# Patient Record
Sex: FEMALE | Race: OTHER
[De-identification: names, ages, dates, MRNs, and addresses within clinical notes are randomized per-mention and may not be internally consistent; named-entity substitution may affect disease eponyms.]

---

## 2019-09-07 ENCOUNTER — HOSPITAL ENCOUNTER (INPATIENT)
Dept: HOSPITAL 54 - ER | Age: 84
LOS: 6 days | Discharge: NURSING, REHAB OR LONG-TERM CARE | DRG: 871 | End: 2019-09-13
Attending: LEGAL MEDICINE | Admitting: NURSE PRACTITIONER
Payer: MEDICARE

## 2019-09-07 VITALS — SYSTOLIC BLOOD PRESSURE: 147 MMHG | DIASTOLIC BLOOD PRESSURE: 65 MMHG

## 2019-09-07 VITALS — BODY MASS INDEX: 20.49 KG/M2 | HEIGHT: 64 IN | WEIGHT: 120 LBS

## 2019-09-07 DIAGNOSIS — G92: ICD-10-CM

## 2019-09-07 DIAGNOSIS — J84.9: ICD-10-CM

## 2019-09-07 DIAGNOSIS — Z16.12: ICD-10-CM

## 2019-09-07 DIAGNOSIS — I25.2: ICD-10-CM

## 2019-09-07 DIAGNOSIS — E83.42: ICD-10-CM

## 2019-09-07 DIAGNOSIS — G20: ICD-10-CM

## 2019-09-07 DIAGNOSIS — E86.0: ICD-10-CM

## 2019-09-07 DIAGNOSIS — I25.10: ICD-10-CM

## 2019-09-07 DIAGNOSIS — I10: ICD-10-CM

## 2019-09-07 DIAGNOSIS — E78.5: ICD-10-CM

## 2019-09-07 DIAGNOSIS — J45.909: ICD-10-CM

## 2019-09-07 DIAGNOSIS — E11.9: ICD-10-CM

## 2019-09-07 DIAGNOSIS — N39.0: ICD-10-CM

## 2019-09-07 DIAGNOSIS — B96.20: ICD-10-CM

## 2019-09-07 DIAGNOSIS — E87.2: ICD-10-CM

## 2019-09-07 DIAGNOSIS — J44.9: ICD-10-CM

## 2019-09-07 DIAGNOSIS — Z79.4: ICD-10-CM

## 2019-09-07 DIAGNOSIS — G30.9: ICD-10-CM

## 2019-09-07 DIAGNOSIS — F32.9: ICD-10-CM

## 2019-09-07 DIAGNOSIS — A41.9: Primary | ICD-10-CM

## 2019-09-07 DIAGNOSIS — F02.80: ICD-10-CM

## 2019-09-07 LAB
ALBUMIN SERPL BCP-MCNC: 3.1 G/DL (ref 3.4–5)
ALP SERPL-CCNC: 112 U/L (ref 46–116)
ALT SERPL W P-5'-P-CCNC: 18 U/L (ref 12–78)
AST SERPL W P-5'-P-CCNC: 15 U/L (ref 15–37)
BASOPHILS # BLD AUTO: 0 /CMM (ref 0–0.2)
BASOPHILS NFR BLD AUTO: 0.6 % (ref 0–2)
BILIRUB DIRECT SERPL-MCNC: 0.1 MG/DL (ref 0–0.2)
BILIRUB SERPL-MCNC: 0.2 MG/DL (ref 0.2–1)
BUN SERPL-MCNC: 19 MG/DL (ref 7–18)
CALCIUM SERPL-MCNC: 9.1 MG/DL (ref 8.5–10.1)
CHLORIDE SERPL-SCNC: 106 MMOL/L (ref 98–107)
CO2 SERPL-SCNC: 27 MMOL/L (ref 21–32)
CREAT SERPL-MCNC: 1.1 MG/DL (ref 0.6–1.3)
EOSINOPHIL NFR BLD AUTO: 1.4 % (ref 0–6)
GLUCOSE SERPL-MCNC: 126 MG/DL (ref 74–106)
HCT VFR BLD AUTO: 37 % (ref 33–45)
HGB BLD-MCNC: 12 G/DL (ref 11.5–14.8)
LYMPHOCYTES NFR BLD AUTO: 1.8 /CMM (ref 0.8–4.8)
LYMPHOCYTES NFR BLD AUTO: 21.8 % (ref 20–44)
MCHC RBC AUTO-ENTMCNC: 33 G/DL (ref 31–36)
MCV RBC AUTO: 93 FL (ref 82–100)
MONOCYTES NFR BLD AUTO: 0.4 /CMM (ref 0.1–1.3)
MONOCYTES NFR BLD AUTO: 5.1 % (ref 2–12)
NEUTROPHILS # BLD AUTO: 5.9 /CMM (ref 1.8–8.9)
NEUTROPHILS NFR BLD AUTO: 71.1 % (ref 43–81)
PH UR STRIP: 5.5 [PH] (ref 5–8)
PLATELET # BLD AUTO: 166 /CMM (ref 150–450)
POTASSIUM SERPL-SCNC: 4.2 MMOL/L (ref 3.5–5.1)
PROT SERPL-MCNC: 6.4 G/DL (ref 6.4–8.2)
RBC # BLD AUTO: 3.95 MIL/UL (ref 4–5.2)
RBC #/AREA URNS HPF: (no result) /HPF (ref 0–2)
SODIUM SERPL-SCNC: 144 MMOL/L (ref 136–145)
UROBILINOGEN UR STRIP-MCNC: 0.2 EU/DL
WBC #/AREA URNS HPF: (no result) /HPF (ref 0–3)
WBC NRBC COR # BLD AUTO: 8.3 K/UL (ref 4.3–11)

## 2019-09-07 PROCEDURE — A6248 HYDROGEL DRSG GEL FILLER: HCPCS

## 2019-09-07 PROCEDURE — C9113 INJ PANTOPRAZOLE SODIUM, VIA: HCPCS

## 2019-09-07 PROCEDURE — G0378 HOSPITAL OBSERVATION PER HR: HCPCS

## 2019-09-07 RX ADMIN — Medication SCH EACH: at 22:16

## 2019-09-07 RX ADMIN — QUETIAPINE SCH MG: 25 TABLET, FILM COATED ORAL at 22:41

## 2019-09-07 RX ADMIN — MONTELUKAST SODIUM SCH MG: 10 TABLET, FILM COATED ORAL at 22:41

## 2019-09-07 RX ADMIN — MEMANTINE HYDROCHLORIDE SCH MG: 5 TABLET ORAL at 22:41

## 2019-09-07 NOTE — NUR
PT [id number], FROM HOME, ALTERED THAN USUAL, , PT IS AAOX1, NOT IN 
RESPIRATORY DISTRESS, HOOKED TO MONITOR, KEPT RESTED AND COMFORTABLE, WILL 
CONTINUE TO MONITOR.

## 2019-09-07 NOTE — NUR
TELE RN ADMITTING NOTES



RECEIVED PATIENT FROM ED VIA [employer]. PRIMARY DX OF SEPSIS PER HOSPITALIST. PATIENT AWAKE, 
NONVERBAL. UNKNOWN PRIMARY LANGUAGE. IV SITE RIGHT HAND 18G, FLUSHING AND PATENT, SITE 
C/D/I. NO IV INFILTRATION NOTED. NO ACTIVE BLEEDING. NO ACUTE DISTRESS NOTED, NO C/O PAIN. 
AFEBRILE. SR ON CARDIAC MONITOR SR WITH HR 80S. BODY ASSESSMENT DONE AND RECORDED (SEE 
FLOWSHEET). SAFETY MEASURES IN PLACE; HOB AT 30 DEGREES, BED LOCKED AND IN LOWEST POSITION, 
BED ALARM ON AND SIDE RAILS UP X2. HOSPITALIST AWARE OF OF PATIENT ARRIVAL ON UNIT, AWAITING 
FOR ORDERS. WILL CONTINUE TO MONITOR PT CLOSELY.

## 2019-09-07 NOTE — NUR
RECEIVED PT FROM DAY SHIFT RN. PT IS CONFUSED. AWAKE, NON VERBAL. RESPONSIVE TO 
TACTILE STIMULI. BREATHING EVEN AND UNLABORED. IN NO ACUTE DISTRESS. GRANDSON 
AT BEDSIDE. IV TO RIGHT HAND RUNNING 1L NS AND ZOSYN.

## 2019-09-08 VITALS — DIASTOLIC BLOOD PRESSURE: 67 MMHG | SYSTOLIC BLOOD PRESSURE: 149 MMHG

## 2019-09-08 VITALS — DIASTOLIC BLOOD PRESSURE: 68 MMHG | SYSTOLIC BLOOD PRESSURE: 153 MMHG

## 2019-09-08 VITALS — DIASTOLIC BLOOD PRESSURE: 44 MMHG | SYSTOLIC BLOOD PRESSURE: 127 MMHG

## 2019-09-08 VITALS — DIASTOLIC BLOOD PRESSURE: 71 MMHG | SYSTOLIC BLOOD PRESSURE: 132 MMHG

## 2019-09-08 VITALS — DIASTOLIC BLOOD PRESSURE: 73 MMHG | SYSTOLIC BLOOD PRESSURE: 156 MMHG

## 2019-09-08 VITALS — SYSTOLIC BLOOD PRESSURE: 145 MMHG | DIASTOLIC BLOOD PRESSURE: 57 MMHG

## 2019-09-08 LAB
ALBUMIN SERPL BCP-MCNC: 2.6 G/DL (ref 3.4–5)
ALP SERPL-CCNC: 85 U/L (ref 46–116)
ALT SERPL W P-5'-P-CCNC: 11 U/L (ref 12–78)
AST SERPL W P-5'-P-CCNC: 11 U/L (ref 15–37)
BASOPHILS # BLD AUTO: 0 /CMM (ref 0–0.2)
BASOPHILS NFR BLD AUTO: 0.5 % (ref 0–2)
BILIRUB SERPL-MCNC: 0.3 MG/DL (ref 0.2–1)
BUN SERPL-MCNC: 17 MG/DL (ref 7–18)
CALCIUM SERPL-MCNC: 8 MG/DL (ref 8.5–10.1)
CHLORIDE SERPL-SCNC: 111 MMOL/L (ref 98–107)
CHOLEST SERPL-MCNC: 161 MG/DL (ref ?–200)
CO2 SERPL-SCNC: 25 MMOL/L (ref 21–32)
CREAT SERPL-MCNC: 0.9 MG/DL (ref 0.6–1.3)
EOSINOPHIL NFR BLD AUTO: 3.9 % (ref 0–6)
GLUCOSE SERPL-MCNC: 61 MG/DL (ref 74–106)
HCT VFR BLD AUTO: 32 % (ref 33–45)
HDLC SERPL-MCNC: 25 MG/DL (ref 40–60)
HGB BLD-MCNC: 10.7 G/DL (ref 11.5–14.8)
LDLC SERPL DIRECT ASSAY-MCNC: 93 MG/DL (ref 0–99)
LYMPHOCYTES NFR BLD AUTO: 2.5 /CMM (ref 0.8–4.8)
LYMPHOCYTES NFR BLD AUTO: 31.9 % (ref 20–44)
MAGNESIUM SERPL-MCNC: 1.6 MG/DL (ref 1.8–2.4)
MCHC RBC AUTO-ENTMCNC: 33 G/DL (ref 31–36)
MCV RBC AUTO: 93 FL (ref 82–100)
MONOCYTES NFR BLD AUTO: 0.5 /CMM (ref 0.1–1.3)
MONOCYTES NFR BLD AUTO: 6.8 % (ref 2–12)
NEUTROPHILS # BLD AUTO: 4.5 /CMM (ref 1.8–8.9)
NEUTROPHILS NFR BLD AUTO: 56.9 % (ref 43–81)
PHOSPHATE SERPL-MCNC: 3 MG/DL (ref 2.5–4.9)
PLATELET # BLD AUTO: 136 /CMM (ref 150–450)
POTASSIUM SERPL-SCNC: 4 MMOL/L (ref 3.5–5.1)
PROT SERPL-MCNC: 5.4 G/DL (ref 6.4–8.2)
RBC # BLD AUTO: 3.49 MIL/UL (ref 4–5.2)
SODIUM SERPL-SCNC: 145 MMOL/L (ref 136–145)
TRIGL SERPL-MCNC: 277 MG/DL (ref 30–150)
TSH SERPL DL<=0.005 MIU/L-ACNC: 0.59 UIU/ML (ref 0.36–3.74)
WBC NRBC COR # BLD AUTO: 7.8 K/UL (ref 4.3–11)

## 2019-09-08 PROCEDURE — 05H633Z INSERTION OF INFUSION DEVICE INTO LEFT SUBCLAVIAN VEIN, PERCUTANEOUS APPROACH: ICD-10-PCS | Performed by: NURSE PRACTITIONER

## 2019-09-08 RX ADMIN — Medication SCH EACH: at 10:43

## 2019-09-08 RX ADMIN — ESCITALOPRAM OXALATE SCH MG: 10 TABLET, FILM COATED ORAL at 10:43

## 2019-09-08 RX ADMIN — DEXTROSE MONOHYDRATE SCH MLS/HR: 50 INJECTION, SOLUTION INTRAVENOUS at 21:18

## 2019-09-08 RX ADMIN — QUETIAPINE SCH MG: 25 TABLET, FILM COATED ORAL at 21:51

## 2019-09-08 RX ADMIN — PANTOPRAZOLE SODIUM SCH MG: 40 TABLET, DELAYED RELEASE ORAL at 10:50

## 2019-09-08 RX ADMIN — MEMANTINE HYDROCHLORIDE SCH MG: 5 TABLET ORAL at 21:51

## 2019-09-08 RX ADMIN — DEXTROSE AND SODIUM CHLORIDE PRN MLS/HR: 5; 900 INJECTION, SOLUTION INTRAVENOUS at 11:37

## 2019-09-08 RX ADMIN — MAGNESIUM SULFATE IN DEXTROSE SCH MLS/HR: 10 INJECTION, SOLUTION INTRAVENOUS at 10:51

## 2019-09-08 RX ADMIN — ALBUTEROL SULFATE PRN MG: 1.25 SOLUTION RESPIRATORY (INHALATION) at 11:31

## 2019-09-08 RX ADMIN — PIPERACILLIN SODIUM AND TAZOBACTAM SODIUM SCH MLS/HR: .375; 3 INJECTION, POWDER, LYOPHILIZED, FOR SOLUTION INTRAVENOUS at 10:16

## 2019-09-08 RX ADMIN — PIPERACILLIN SODIUM AND TAZOBACTAM SODIUM SCH MLS/HR: .375; 3 INJECTION, POWDER, LYOPHILIZED, FOR SOLUTION INTRAVENOUS at 00:34

## 2019-09-08 RX ADMIN — MEGESTROL ACETATE SCH MG: 40 TABLET ORAL at 10:44

## 2019-09-08 RX ADMIN — INSULIN ASPART SCH UNIT: 100 INJECTION, SOLUTION INTRAVENOUS; SUBCUTANEOUS at 17:00

## 2019-09-08 RX ADMIN — Medication SCH EACH: at 07:30

## 2019-09-08 RX ADMIN — INSULIN HUMAN PRN UNIT: 100 INJECTION, SOLUTION PARENTERAL at 21:43

## 2019-09-08 RX ADMIN — INSULIN ASPART SCH UNIT: 100 INJECTION, SOLUTION INTRAVENOUS; SUBCUTANEOUS at 09:00

## 2019-09-08 RX ADMIN — Medication SCH EACH: at 21:43

## 2019-09-08 RX ADMIN — Medication SCH EACH: at 11:37

## 2019-09-08 RX ADMIN — PIPERACILLIN SODIUM AND TAZOBACTAM SODIUM SCH MLS/HR: .375; 3 INJECTION, POWDER, LYOPHILIZED, FOR SOLUTION INTRAVENOUS at 05:39

## 2019-09-08 RX ADMIN — Medication SCH EACH: at 17:15

## 2019-09-08 RX ADMIN — MONTELUKAST SODIUM SCH MG: 10 TABLET, FILM COATED ORAL at 21:51

## 2019-09-08 RX ADMIN — MAGNESIUM SULFATE IN DEXTROSE SCH MLS/HR: 10 INJECTION, SOLUTION INTRAVENOUS at 12:37

## 2019-09-08 RX ADMIN — Medication SCH MG: at 10:44

## 2019-09-08 RX ADMIN — PIPERACILLIN SODIUM AND TAZOBACTAM SODIUM SCH MLS/HR: .375; 3 INJECTION, POWDER, LYOPHILIZED, FOR SOLUTION INTRAVENOUS at 17:15

## 2019-09-08 RX ADMIN — ATORVASTATIN CALCIUM SCH MG: 10 TABLET, FILM COATED ORAL at 10:43

## 2019-09-08 NOTE — NUR
TELE RN CLOSING NOTES



PATIENT SLEEPING IN BED, BUT EASY TO AROUSE, NONVERBAL. IV SITE RIGHT HAND 18G AND RIGHT 
WRIST 22G, BOTH FLUSHING AND PATENT, SITES C/D/I. IV FLUIDS RUNNING AS ORDERED, NO IV 
INFILTRATION NOTED. NO ACUTE DISTRESS NOTED, NO C/O PAIN. AFEBRILE. SR ON CARDIAC MONITOR SR 
WITH HR 80S. SAFETY MEASURES MAINTAINED; HOB AT 30 DEGREES, BED LOCKED AND IN LOWEST 
POSITION, BED ALARM ON AND SIDE RAILS UP X2. ALL ADMITTING ORDERS ATTENDED. KEPT PATIENT 
COMFORTABLE. REPOSITIONED Q2H. WILL ENDORSE TO AM RN FOR STEPHANIA.

## 2019-09-08 NOTE — NUR
RN NOTES,



PATIENT'S PO MEDICATIONS FOR 2200 NON ADMINISTERED DUE TO PATIENT NPO AT THIS TIME, SWALLOW 
EVALUATION IN THE MORNING, CONCERN FOR ASPIRATION ACCORDING TO PRIOR NURSE REPORT, WILL 
CONTINUE TO MONITOR CLOSELY..

## 2019-09-09 VITALS — SYSTOLIC BLOOD PRESSURE: 151 MMHG | DIASTOLIC BLOOD PRESSURE: 56 MMHG

## 2019-09-09 VITALS — SYSTOLIC BLOOD PRESSURE: 154 MMHG | DIASTOLIC BLOOD PRESSURE: 74 MMHG

## 2019-09-09 VITALS — SYSTOLIC BLOOD PRESSURE: 135 MMHG | DIASTOLIC BLOOD PRESSURE: 58 MMHG

## 2019-09-09 VITALS — SYSTOLIC BLOOD PRESSURE: 149 MMHG | DIASTOLIC BLOOD PRESSURE: 72 MMHG

## 2019-09-09 VITALS — DIASTOLIC BLOOD PRESSURE: 65 MMHG | SYSTOLIC BLOOD PRESSURE: 150 MMHG

## 2019-09-09 VITALS — DIASTOLIC BLOOD PRESSURE: 63 MMHG | SYSTOLIC BLOOD PRESSURE: 124 MMHG

## 2019-09-09 LAB
BASOPHILS # BLD AUTO: 0 /CMM (ref 0–0.2)
BASOPHILS NFR BLD AUTO: 0.6 % (ref 0–2)
BUN SERPL-MCNC: 11 MG/DL (ref 7–18)
CALCIUM SERPL-MCNC: 8.2 MG/DL (ref 8.5–10.1)
CHLORIDE SERPL-SCNC: 110 MMOL/L (ref 98–107)
CO2 SERPL-SCNC: 25 MMOL/L (ref 21–32)
CREAT SERPL-MCNC: 0.8 MG/DL (ref 0.6–1.3)
EOSINOPHIL NFR BLD AUTO: 4 % (ref 0–6)
EOSINOPHIL NFR BLD MANUAL: 4 % (ref 0–4)
GLUCOSE SERPL-MCNC: 78 MG/DL (ref 74–106)
HCT VFR BLD AUTO: 32 % (ref 33–45)
HGB BLD-MCNC: 10.8 G/DL (ref 11.5–14.8)
LYMPHOCYTES NFR BLD AUTO: 2.7 /CMM (ref 0.8–4.8)
LYMPHOCYTES NFR BLD AUTO: 34.3 % (ref 20–44)
LYMPHOCYTES NFR BLD MANUAL: 37 % (ref 16–48)
MAGNESIUM SERPL-MCNC: 2 MG/DL (ref 1.8–2.4)
MCHC RBC AUTO-ENTMCNC: 33 G/DL (ref 31–36)
MCV RBC AUTO: 92 FL (ref 82–100)
METAMYELOCYTES NFR BLD MANUAL: 1 % (ref 0–0)
MONOCYTES NFR BLD AUTO: 0.5 /CMM (ref 0.1–1.3)
MONOCYTES NFR BLD AUTO: 6.6 % (ref 2–12)
MONOCYTES NFR BLD MANUAL: 3 % (ref 0–11)
NEUTROPHILS # BLD AUTO: 4.3 /CMM (ref 1.8–8.9)
NEUTROPHILS NFR BLD AUTO: 54.5 % (ref 43–81)
NEUTS BAND NFR BLD MANUAL: 1 % (ref 0–5)
NEUTS SEG NFR BLD MANUAL: 54 % (ref 42–76)
PHOSPHATE SERPL-MCNC: 2.7 MG/DL (ref 2.5–4.9)
PLATELET # BLD AUTO: 160 /CMM (ref 150–450)
POTASSIUM SERPL-SCNC: 3.7 MMOL/L (ref 3.5–5.1)
RBC # BLD AUTO: 3.53 MIL/UL (ref 4–5.2)
SODIUM SERPL-SCNC: 144 MMOL/L (ref 136–145)
WBC NRBC COR # BLD AUTO: 7.8 K/UL (ref 4.3–11)

## 2019-09-09 RX ADMIN — Medication SCH EACH: at 08:07

## 2019-09-09 RX ADMIN — PIPERACILLIN SODIUM AND TAZOBACTAM SODIUM SCH MLS/HR: .375; 3 INJECTION, POWDER, LYOPHILIZED, FOR SOLUTION INTRAVENOUS at 00:31

## 2019-09-09 RX ADMIN — GLIMEPIRIDE SCH MG: 1 TABLET ORAL at 10:42

## 2019-09-09 RX ADMIN — ESCITALOPRAM OXALATE SCH MG: 10 TABLET, FILM COATED ORAL at 08:20

## 2019-09-09 RX ADMIN — Medication SCH EACH: at 12:00

## 2019-09-09 RX ADMIN — INSULIN ASPART SCH UNIT: 100 INJECTION, SOLUTION INTRAVENOUS; SUBCUTANEOUS at 09:00

## 2019-09-09 RX ADMIN — PIPERACILLIN SODIUM AND TAZOBACTAM SODIUM SCH MLS/HR: .375; 3 INJECTION, POWDER, LYOPHILIZED, FOR SOLUTION INTRAVENOUS at 08:21

## 2019-09-09 RX ADMIN — QUETIAPINE SCH MG: 25 TABLET, FILM COATED ORAL at 21:00

## 2019-09-09 RX ADMIN — Medication SCH EACH: at 09:10

## 2019-09-09 RX ADMIN — ALBUTEROL SCH MG: 2 TABLET ORAL at 16:45

## 2019-09-09 RX ADMIN — Medication SCH MG: at 08:20

## 2019-09-09 RX ADMIN — METOPROLOL SUCCINATE SCH MG: 50 TABLET, EXTENDED RELEASE ORAL at 10:42

## 2019-09-09 RX ADMIN — PANTOPRAZOLE SODIUM SCH MG: 40 TABLET, DELAYED RELEASE ORAL at 07:30

## 2019-09-09 RX ADMIN — PIPERACILLIN SODIUM AND TAZOBACTAM SODIUM SCH MLS/HR: .375; 3 INJECTION, POWDER, LYOPHILIZED, FOR SOLUTION INTRAVENOUS at 16:45

## 2019-09-09 RX ADMIN — DEXTROSE AND SODIUM CHLORIDE PRN MLS/HR: 5; 900 INJECTION, SOLUTION INTRAVENOUS at 08:30

## 2019-09-09 RX ADMIN — ATORVASTATIN CALCIUM SCH MG: 10 TABLET, FILM COATED ORAL at 08:20

## 2019-09-09 RX ADMIN — ALBUTEROL SCH MG: 2 TABLET ORAL at 10:59

## 2019-09-09 RX ADMIN — DEXTROSE MONOHYDRATE SCH MLS/HR: 50 INJECTION, SOLUTION INTRAVENOUS at 20:59

## 2019-09-09 RX ADMIN — Medication SCH EACH: at 16:42

## 2019-09-09 RX ADMIN — MEGESTROL ACETATE SCH MG: 40 TABLET ORAL at 08:20

## 2019-09-09 RX ADMIN — Medication SCH EACH: at 08:19

## 2019-09-09 RX ADMIN — ALBUTEROL SULFATE PRN MG: 1.25 SOLUTION RESPIRATORY (INHALATION) at 06:07

## 2019-09-09 RX ADMIN — Medication SCH EACH: at 16:45

## 2019-09-09 RX ADMIN — MONTELUKAST SODIUM SCH MG: 10 TABLET, FILM COATED ORAL at 21:06

## 2019-09-09 RX ADMIN — Medication SCH MG: at 08:18

## 2019-09-09 RX ADMIN — AMLODIPINE BESYLATE SCH MG: 5 TABLET ORAL at 21:06

## 2019-09-09 RX ADMIN — Medication SCH GM: at 11:00

## 2019-09-09 RX ADMIN — MEMANTINE HYDROCHLORIDE SCH MG: 5 TABLET ORAL at 21:00

## 2019-09-09 RX ADMIN — INSULIN ASPART SCH UNIT: 100 INJECTION, SOLUTION INTRAVENOUS; SUBCUTANEOUS at 16:41

## 2019-09-09 RX ADMIN — Medication SCH EACH: at 22:00

## 2019-09-09 NOTE — NUR
RN NOTES,



RECEIVED PATIENT IN BED, AWAKE NONVERBAL,  ON ROOM AIR, BREATHING EVEN AND UNLABORED, NO 
SOB/ACUTE RESPIRATION DISTRESS, NSR ON TELE MONITOR WITH HR IN THE 60S AT THIS TIME,  NO 
SIGNS OF PAIN/DISCOMFORT, JUAN MIDLINE  IN PLACED, SITE CLEAR, PATENT AND INTACT, IV FLUIDS 
INFUSING WELL AND PATIENT TOLERATED WELL,  KEPT PATIENT DRY AND CLEAN, WELL REPOSITIONED AT 
THIS TIME, SAFETY MEASURES IN PLACE,  HOB ELEVATED,  BED LOCKED AND IN LOWEST  POSITION, 
CALL LIGHT,  WITHIN REACH, BILATERAL 2 1/2 S/R  OF BED UP, WILL CONTINUE TO MONITOR PATIENT  
CLOSELY

## 2019-09-09 NOTE — NUR
YUNIOR NOTES,



PATIENT IN NOCTURNAL BIPAP AT THIS TIME, TOLERATED WELL, WILL CONTINUE TO MONITOR CLOSELY.

-------------------------------------------------------------------------------

Addendum: 09/10/19 at 0627 by EFRAÍN VALDIVIA RN

-------------------------------------------------------------------------------

WRONG PATIENT

## 2019-09-09 NOTE — NUR
TELE RN AM NOTES



RECEIVED PATIENT IN BED, ASLEEP, RESPONDS TO NAME AND TOUCH, NONVERBAL. ON ROOM AIR, NO SOB, 
RESPIRATION UNLABORED, SR HR 67 ON TELE MONITOR, NO SIGNS OF PAIN/DISCOMFORT, JUAN MIDLINE 
FLUSHES WELL, SITE CLEAR, C/D/I DRESSING. AFEBRILE. SEE NURSING FLOWSHEET FOR SKIN 
ASSESSMENT. USES DIAPER, NEEDS TO BE TURNED AND REPOSITIONED Q 2HOURS, SAFETY MEASURES IN 
PLACE; HOB AT 30 DEGREES, BED LOCKED AND IN LOWEST POSITION, BED ALARM ON AND SIDE RAILS UP 
X2. WILL CONTINUE TO MONITOR PT CLOSELY.

## 2019-09-09 NOTE — NUR
RN NOTES,



ACCUCHEK NOT ADMINISTERED, ACCORDING TO YUNIOR HALE PER DR BLEDSOE NOT TO ADMINISTERED 
ACCUCHEK AND INSULIN PER SLIDING COVERAGE. 

-------------------------------------------------------------------------------

Addendum: 09/10/19 at 0139 by EFRAÍN VALDIVIA RN

-------------------------------------------------------------------------------

CORRECT DR. ARREDONDO.

## 2019-09-09 NOTE — NUR
SHANI MOJICA NOTES



DR. ARREDONDO AT BEDSIDE

PER HIM OKAY TO DC BLOOD SUGAR CHECK AN NOVOLOG INSULIN

-------------------------------------------------------------------------------

Addendum: 09/09/19 at 0911 by GEOFFREY GROVER RN

-------------------------------------------------------------------------------

CORRECTION:



PER DR. ARREDONDO, HOLD SUGAR CHECKS AND NOVOLOG INSULIN

## 2019-09-09 NOTE — NUR
TELE RN NOTES



0800 - BS CHECK 73 MG/DL. NO INSULIN COVERAGE GIVEN



SEEN BY KALA SPEECH PATHOLOGIST FOR SWALLOW EVAL.

## 2019-09-09 NOTE — NUR
TELE RN CLOSING NOTES



PATIENT IN BED, RESTING COMFORTABLY, RESPONDS TO NAME AND TOUCH, NONVERBAL. ON ROOM AIR, NO 
SOB, RESPIRATION UNLABORED, SR HR 67 ON TELE MONITOR, NO SIGNS OF PAIN/DISCOMFORT, JUAN 
MIDLINE FLUSHES WELL, SITE CLEAR, C/D/I DRESSING. AFEBRILE.  USES DIAPER, TURNED AND 
REPOSITIONED Q 2HOURS, SAFETY MEASURES IN PLACE; HOB AT 30 DEGREES, BED LOCKED AND IN LOWEST 
POSITION, BED ALARM ON AND SIDE RAILS UP X2. ALL NEEDS MET AT HTIS TIME. PM CARE DONE. WILL 
ENDORSE TO NEXT SHIFT FOR STEPHANIA.

## 2019-09-10 VITALS — DIASTOLIC BLOOD PRESSURE: 46 MMHG | SYSTOLIC BLOOD PRESSURE: 141 MMHG

## 2019-09-10 VITALS — SYSTOLIC BLOOD PRESSURE: 140 MMHG | DIASTOLIC BLOOD PRESSURE: 62 MMHG

## 2019-09-10 VITALS — SYSTOLIC BLOOD PRESSURE: 147 MMHG | DIASTOLIC BLOOD PRESSURE: 68 MMHG

## 2019-09-10 VITALS — SYSTOLIC BLOOD PRESSURE: 115 MMHG | DIASTOLIC BLOOD PRESSURE: 73 MMHG

## 2019-09-10 VITALS — SYSTOLIC BLOOD PRESSURE: 143 MMHG | DIASTOLIC BLOOD PRESSURE: 60 MMHG

## 2019-09-10 LAB
BASOPHILS # BLD AUTO: 0.1 /CMM (ref 0–0.2)
BASOPHILS NFR BLD AUTO: 0.8 % (ref 0–2)
BUN SERPL-MCNC: 10 MG/DL (ref 7–18)
CALCIUM SERPL-MCNC: 8.2 MG/DL (ref 8.5–10.1)
CHLORIDE SERPL-SCNC: 109 MMOL/L (ref 98–107)
CO2 SERPL-SCNC: 25 MMOL/L (ref 21–32)
CREAT SERPL-MCNC: 0.8 MG/DL (ref 0.6–1.3)
EOSINOPHIL NFR BLD AUTO: 5.5 % (ref 0–6)
EOSINOPHIL NFR BLD MANUAL: 5 % (ref 0–4)
GLUCOSE SERPL-MCNC: 126 MG/DL (ref 74–106)
HCT VFR BLD AUTO: 33 % (ref 33–45)
HGB BLD-MCNC: 11 G/DL (ref 11.5–14.8)
LYMPHOCYTES NFR BLD AUTO: 2.8 /CMM (ref 0.8–4.8)
LYMPHOCYTES NFR BLD AUTO: 34.4 % (ref 20–44)
LYMPHOCYTES NFR BLD MANUAL: 28 % (ref 16–48)
MAGNESIUM SERPL-MCNC: 1.6 MG/DL (ref 1.8–2.4)
MCHC RBC AUTO-ENTMCNC: 33 G/DL (ref 31–36)
MCV RBC AUTO: 92 FL (ref 82–100)
MONOCYTES NFR BLD AUTO: 0.6 /CMM (ref 0.1–1.3)
MONOCYTES NFR BLD AUTO: 6.7 % (ref 2–12)
MONOCYTES NFR BLD MANUAL: 6 % (ref 0–11)
NEUTROPHILS # BLD AUTO: 4.3 /CMM (ref 1.8–8.9)
NEUTROPHILS NFR BLD AUTO: 52.6 % (ref 43–81)
NEUTS BAND NFR BLD MANUAL: 4 % (ref 0–5)
NEUTS SEG NFR BLD MANUAL: 57 % (ref 42–76)
PLATELET # BLD AUTO: 170 /CMM (ref 150–450)
POTASSIUM SERPL-SCNC: 4.5 MMOL/L (ref 3.5–5.1)
RBC # BLD AUTO: 3.61 MIL/UL (ref 4–5.2)
SODIUM SERPL-SCNC: 143 MMOL/L (ref 136–145)
WBC NRBC COR # BLD AUTO: 8.2 K/UL (ref 4.3–11)

## 2019-09-10 RX ADMIN — PANTOPRAZOLE SODIUM SCH MG: 40 TABLET, DELAYED RELEASE ORAL at 08:13

## 2019-09-10 RX ADMIN — QUETIAPINE SCH MG: 25 TABLET, FILM COATED ORAL at 21:10

## 2019-09-10 RX ADMIN — MAGNESIUM SULFATE IN DEXTROSE SCH MLS/HR: 10 INJECTION, SOLUTION INTRAVENOUS at 10:30

## 2019-09-10 RX ADMIN — GLIMEPIRIDE SCH MG: 1 TABLET ORAL at 08:14

## 2019-09-10 RX ADMIN — Medication SCH APPLIC: at 08:35

## 2019-09-10 RX ADMIN — ALBUTEROL SCH MG: 2 TABLET ORAL at 08:33

## 2019-09-10 RX ADMIN — PIPERACILLIN SODIUM AND TAZOBACTAM SODIUM SCH MLS/HR: .375; 3 INJECTION, POWDER, LYOPHILIZED, FOR SOLUTION INTRAVENOUS at 08:14

## 2019-09-10 RX ADMIN — Medication SCH EACH: at 07:56

## 2019-09-10 RX ADMIN — MEMANTINE HYDROCHLORIDE SCH MG: 5 TABLET ORAL at 21:09

## 2019-09-10 RX ADMIN — MUPIROCIN SCH APPLIC: 20 OINTMENT TOPICAL at 16:52

## 2019-09-10 RX ADMIN — MEGESTROL ACETATE SCH MG: 40 TABLET ORAL at 08:15

## 2019-09-10 RX ADMIN — ALBUTEROL SCH MG: 2 TABLET ORAL at 16:24

## 2019-09-10 RX ADMIN — Medication SCH EACH: at 17:02

## 2019-09-10 RX ADMIN — Medication SCH MG: at 08:15

## 2019-09-10 RX ADMIN — INSULIN ASPART SCH UNIT: 100 INJECTION, SOLUTION INTRAVENOUS; SUBCUTANEOUS at 10:00

## 2019-09-10 RX ADMIN — Medication SCH EACH: at 08:21

## 2019-09-10 RX ADMIN — METOPROLOL SUCCINATE SCH MG: 50 TABLET, EXTENDED RELEASE ORAL at 08:29

## 2019-09-10 RX ADMIN — INSULIN HUMAN PRN UNIT: 100 INJECTION, SOLUTION PARENTERAL at 12:35

## 2019-09-10 RX ADMIN — AMLODIPINE BESYLATE SCH MG: 5 TABLET ORAL at 21:10

## 2019-09-10 RX ADMIN — INSULIN ASPART SCH UNIT: 100 INJECTION, SOLUTION INTRAVENOUS; SUBCUTANEOUS at 17:00

## 2019-09-10 RX ADMIN — MAGNESIUM SULFATE IN DEXTROSE SCH MLS/HR: 10 INJECTION, SOLUTION INTRAVENOUS at 11:31

## 2019-09-10 RX ADMIN — Medication SCH MG: at 08:14

## 2019-09-10 RX ADMIN — PIPERACILLIN SODIUM AND TAZOBACTAM SODIUM SCH MLS/HR: .375; 3 INJECTION, POWDER, LYOPHILIZED, FOR SOLUTION INTRAVENOUS at 01:28

## 2019-09-10 RX ADMIN — ESCITALOPRAM OXALATE SCH MG: 10 TABLET, FILM COATED ORAL at 08:15

## 2019-09-10 RX ADMIN — Medication SCH EACH: at 16:24

## 2019-09-10 RX ADMIN — DEXTROSE AND SODIUM CHLORIDE PRN MLS/HR: 5; 900 INJECTION, SOLUTION INTRAVENOUS at 02:42

## 2019-09-10 RX ADMIN — MONTELUKAST SODIUM SCH MG: 10 TABLET, FILM COATED ORAL at 21:10

## 2019-09-10 RX ADMIN — MEROPENEM SCH MLS/HR: 1 INJECTION INTRAVENOUS at 14:00

## 2019-09-10 RX ADMIN — Medication SCH EACH: at 08:14

## 2019-09-10 RX ADMIN — Medication SCH EACH: at 12:18

## 2019-09-10 RX ADMIN — Medication SCH EACH: at 22:07

## 2019-09-10 NOTE — NUR
RN NOTE:



BEDSIDE REPORT WAS GIVEN TO PM SHIFT NURSE FOR CONTINUITY OF CARE. PATIENT REMAINED ON 
CONTACT ISOLATION FOR MRSA NARES. PROPER HANDWASHING WAS OBSERVED. BED ALARMED AND LOCKED AT 
ALL TIMES.

## 2019-09-10 NOTE — NUR
RN NOTES,



PATIENT OUT OF BIPAP AT THIS TIME, AND PLACED IN O2 3LPM VIA NC, WITH STABLE VITAL SIGNS AND 
O2 SATURATION LEVEL.

-------------------------------------------------------------------------------

Addendum: 09/10/19 at 0627 by EFRAÍN VALDIVIA RN

-------------------------------------------------------------------------------

WRONG PATIENT

## 2019-09-10 NOTE — NUR
RN NOTES,



PATIENT NOTED WITH AN EPISODE OF DESATURATION IN THE LOWS 70S, TITRATED O2 UP AND O2 
SATURATION LEVEL  INCREASED TO ABOVE 95%, AFTER STABILIZED O2 SAT LEVEL, O2 TITRATED DOWN TO 
3LPM VIA NC, WILL CONTINUE TO MONITOR CLOSELY. 

-------------------------------------------------------------------------------

Addendum: 09/10/19 at 0628 by EFRAÍN VALDIVIA RN

-------------------------------------------------------------------------------

WRONG PATIENT

## 2019-09-10 NOTE — NUR
RN NOTES,



PATIENT FIGHTING WHILE PROVIDING CARE, AND SCRATCHING PERSONNEL WHO IS PROVIDING CARE, ALL 
SAFETY MEASURES TAKEN, REMOVED ONE RESTRAIN AT ONCE, STILL PATIENT ATTEMPTING TO SCRATCH AND 
HIT, REDIRECTION OF BEHAVIOR PROVIDED. 

-------------------------------------------------------------------------------

Addendum: 09/10/19 at 0627 by EFRAÍN VALDIVIA RN

-------------------------------------------------------------------------------

WRONG PATIENT

## 2019-09-10 NOTE — NUR
RN NOTES,



 PATIENT  IN BED BREATHING EVEN AND UNLABORED, WITH OPTIMAL O2 SAT LEVEL  NO SOB/ACUTE 
DISTRESS NOTED,  AT ROOM AIR  WITH NO S/S OR C/O PAIN OR DISCOMFORT,  NO SIGNIFICANT CHANGE 
IN CONDITION DURING THE NIGHT, WILL ENDORSE CONTINUITY OF CARE TO ONCOMING NURSE, IVF AND 
ANTIBIOTIC INFUSING WELL VIA JUAN MIDLINE, AND PATIENT TOLERATED WELL,  CALL LIGHT WITHIN 
REACH,  ALL NEEDS  PROVIDED, KEPT  DRY AND CLEAN,  BED LOCKED AND  LOW POSITION, KEPT DRY 
AND CLEAN,  WILL CONTINUE  TO MONITOR PATIENT CLOSELY.

## 2019-09-10 NOTE — NUR
RN NOTE:



RECEIVED PATIENT IN BED, ASLEEP, BUT AROUSABLE WITH TACTILE STIMULI AND NONVERBAL. ON 
CARDIAC MONITOR SR HR= 93. HOB ELEVATED. BED ALARMED AND LOCKED AT ALL TIMES. BED ON LOWEST 
POSITION. ON CONTACT ISOLATION FOR MRSA NARES AND OBSERVED AT ALL TIMES. AFEBRILE. SKIN WARM 
TO TOUCH. (L) UA MIDLINE NOTED PATENT AND INTACT WITH D5NS @60ML/HR. CALL LIGHT WITHIN 
REACH. NEEDS ANTICIPATED.

## 2019-09-11 VITALS — SYSTOLIC BLOOD PRESSURE: 155 MMHG | DIASTOLIC BLOOD PRESSURE: 77 MMHG

## 2019-09-11 VITALS — DIASTOLIC BLOOD PRESSURE: 55 MMHG | SYSTOLIC BLOOD PRESSURE: 155 MMHG

## 2019-09-11 VITALS — SYSTOLIC BLOOD PRESSURE: 151 MMHG | DIASTOLIC BLOOD PRESSURE: 71 MMHG

## 2019-09-11 VITALS — DIASTOLIC BLOOD PRESSURE: 60 MMHG | SYSTOLIC BLOOD PRESSURE: 147 MMHG

## 2019-09-11 LAB
BASOPHILS # BLD AUTO: 0.1 /CMM (ref 0–0.2)
BASOPHILS NFR BLD AUTO: 0.8 % (ref 0–2)
BUN SERPL-MCNC: 7 MG/DL (ref 7–18)
CALCIUM SERPL-MCNC: 8.6 MG/DL (ref 8.5–10.1)
CHLORIDE SERPL-SCNC: 109 MMOL/L (ref 98–107)
CO2 SERPL-SCNC: 26 MMOL/L (ref 21–32)
CREAT SERPL-MCNC: 0.8 MG/DL (ref 0.6–1.3)
EOSINOPHIL NFR BLD AUTO: 4 % (ref 0–6)
GLUCOSE SERPL-MCNC: 70 MG/DL (ref 74–106)
HCT VFR BLD AUTO: 31 % (ref 33–45)
HGB BLD-MCNC: 10.7 G/DL (ref 11.5–14.8)
LYMPHOCYTES NFR BLD AUTO: 3 /CMM (ref 0.8–4.8)
LYMPHOCYTES NFR BLD AUTO: 36.7 % (ref 20–44)
MCHC RBC AUTO-ENTMCNC: 34 G/DL (ref 31–36)
MCV RBC AUTO: 91 FL (ref 82–100)
MONOCYTES NFR BLD AUTO: 0.5 /CMM (ref 0.1–1.3)
MONOCYTES NFR BLD AUTO: 6.1 % (ref 2–12)
NEUTROPHILS # BLD AUTO: 4.2 /CMM (ref 1.8–8.9)
NEUTROPHILS NFR BLD AUTO: 52.4 % (ref 43–81)
PLATELET # BLD AUTO: 193 /CMM (ref 150–450)
POTASSIUM SERPL-SCNC: 3.9 MMOL/L (ref 3.5–5.1)
RBC # BLD AUTO: 3.46 MIL/UL (ref 4–5.2)
SODIUM SERPL-SCNC: 144 MMOL/L (ref 136–145)
WBC NRBC COR # BLD AUTO: 8.1 K/UL (ref 4.3–11)

## 2019-09-11 RX ADMIN — INSULIN HUMAN PRN UNIT: 100 INJECTION, SOLUTION PARENTERAL at 21:29

## 2019-09-11 RX ADMIN — LOSARTAN POTASSIUM SCH MG: 50 TABLET, FILM COATED ORAL at 09:20

## 2019-09-11 RX ADMIN — ALBUTEROL SCH MG: 2 TABLET ORAL at 11:18

## 2019-09-11 RX ADMIN — Medication SCH EACH: at 21:06

## 2019-09-11 RX ADMIN — Medication SCH EACH: at 17:12

## 2019-09-11 RX ADMIN — ESCITALOPRAM OXALATE SCH MG: 10 TABLET, FILM COATED ORAL at 09:17

## 2019-09-11 RX ADMIN — MUPIROCIN SCH APPLIC: 20 OINTMENT TOPICAL at 15:56

## 2019-09-11 RX ADMIN — Medication SCH APPLIC: at 09:18

## 2019-09-11 RX ADMIN — MUPIROCIN SCH APPLIC: 20 OINTMENT TOPICAL at 02:34

## 2019-09-11 RX ADMIN — Medication SCH EACH: at 08:04

## 2019-09-11 RX ADMIN — MEROPENEM SCH MLS/HR: 1 INJECTION INTRAVENOUS at 00:27

## 2019-09-11 RX ADMIN — AMLODIPINE BESYLATE SCH MG: 5 TABLET ORAL at 21:08

## 2019-09-11 RX ADMIN — MEGESTROL ACETATE SCH MG: 40 TABLET ORAL at 09:14

## 2019-09-11 RX ADMIN — Medication PRN GM: at 09:16

## 2019-09-11 RX ADMIN — QUETIAPINE SCH MG: 25 TABLET, FILM COATED ORAL at 21:05

## 2019-09-11 RX ADMIN — METOPROLOL SUCCINATE SCH MG: 50 TABLET, EXTENDED RELEASE ORAL at 09:16

## 2019-09-11 RX ADMIN — Medication SCH MG: at 09:15

## 2019-09-11 RX ADMIN — Medication SCH EACH: at 09:15

## 2019-09-11 RX ADMIN — MEMANTINE HYDROCHLORIDE SCH MG: 5 TABLET ORAL at 21:05

## 2019-09-11 RX ADMIN — LOSARTAN POTASSIUM SCH MG: 50 TABLET, FILM COATED ORAL at 21:06

## 2019-09-11 RX ADMIN — ALBUTEROL SCH MG: 2 TABLET ORAL at 17:12

## 2019-09-11 RX ADMIN — MEROPENEM SCH MLS/HR: 1 INJECTION INTRAVENOUS at 12:34

## 2019-09-11 RX ADMIN — GLIMEPIRIDE SCH MG: 1 TABLET ORAL at 09:15

## 2019-09-11 RX ADMIN — INSULIN ASPART SCH UNIT: 100 INJECTION, SOLUTION INTRAVENOUS; SUBCUTANEOUS at 09:00

## 2019-09-11 RX ADMIN — Medication SCH EACH: at 12:34

## 2019-09-11 RX ADMIN — PANTOPRAZOLE SODIUM SCH MG: 40 TABLET, DELAYED RELEASE ORAL at 09:15

## 2019-09-11 RX ADMIN — MONTELUKAST SODIUM SCH MG: 10 TABLET, FILM COATED ORAL at 21:05

## 2019-09-11 RX ADMIN — INSULIN ASPART SCH UNIT: 100 INJECTION, SOLUTION INTRAVENOUS; SUBCUTANEOUS at 17:00

## 2019-09-11 NOTE — NUR
RN MS NOTES

PT IN BED, RESTING, NON VERBAL, NO SIGN OF PAIN OR DISTRESS, CALL LIGHT WITHIN REACH, PM 
MEDS GIVEN, PM CARE PROVIDED, SPOKE WITH DAUGHTER MUKESH OVER THE PHONE, PLAN OF CARE 
DISCUSSED WITH PT, VERBALIZED UNDERSTANDING, ALL NEEDS ATTENDED.

## 2019-09-11 NOTE — NUR
RN MS NOTES

PT IN BED, RESTING, NON VERBAL, NO SIGN OF PAIN OR DISTRESS, ASSISTED WITH MEALS, TOLERATING 
WELL, DUE MEDS GIVEN, KEPT COMFORTABLE.

## 2019-09-11 NOTE — NUR
RN MS NOTES

PT IN BED, AWAKE, NO SIGN OF PAIN, NOT IN DISTRESS, CALL LIGHT WITHIN REACH, KEPT WARM IN 
BED, NEEDS ATTENDED.

## 2019-09-11 NOTE — NUR
RN OPENING NOTES:



PT IN BED, RESTING, NON VERBAL, NO SIGN OF PAIN OR DISTRESS, CALL LIGHT WITHIN REACH, PM 
MEDS GIVEN, PM CARE PROVIDED, WILL CONTINUE PLAN OF CARE AND MAKE SURE ALL NEEDS ARE MET. SO 
SIGNS OF ACUTE DISTRESS NOTED. SAFETY PRECAUTIONS IN PLCE.

## 2019-09-11 NOTE — NUR
RN NOTES,



PATIENT SLEEPING  IN BED BREATHING EVEN AND UNLABORED, WITH OPTIMAL O2 SAT LEVEL  AT ROOM 
AIR, NO SOB/ACUTE DISTRESS NOTED,  WITH NO S/S OF PAIN OR DISCOMFORT,  NO SIGNIFICANT CHANGE 
IN CONDITION DURING THE NIGHT, WILL ENDORSE CONTINUITY OF CARE TO ONCOMING NURSE,  CALL 
LIGHT WITHIN REACH,  ALL NEEDS  PROVIDED, KEPT  DRY AND CLEAN, AND WELL REPOSITIONED,   BED 
LOCKED AND  LOW POSITION, KEPT DRY AND CLEAN, S/R OF BED UP,  WILL ENDORSE CONTINUITY OF 
CARE TO ONCOMING NURSE.

## 2019-09-12 VITALS — SYSTOLIC BLOOD PRESSURE: 134 MMHG | DIASTOLIC BLOOD PRESSURE: 64 MMHG

## 2019-09-12 VITALS — SYSTOLIC BLOOD PRESSURE: 170 MMHG | DIASTOLIC BLOOD PRESSURE: 68 MMHG

## 2019-09-12 VITALS — SYSTOLIC BLOOD PRESSURE: 141 MMHG | DIASTOLIC BLOOD PRESSURE: 63 MMHG

## 2019-09-12 VITALS — DIASTOLIC BLOOD PRESSURE: 62 MMHG | SYSTOLIC BLOOD PRESSURE: 132 MMHG

## 2019-09-12 LAB
BUN SERPL-MCNC: 8 MG/DL (ref 7–18)
CALCIUM SERPL-MCNC: 8.7 MG/DL (ref 8.5–10.1)
CHLORIDE SERPL-SCNC: 110 MMOL/L (ref 98–107)
CO2 SERPL-SCNC: 28 MMOL/L (ref 21–32)
CREAT SERPL-MCNC: 0.7 MG/DL (ref 0.6–1.3)
GLUCOSE SERPL-MCNC: 85 MG/DL (ref 74–106)
POTASSIUM SERPL-SCNC: 3.9 MMOL/L (ref 3.5–5.1)
SODIUM SERPL-SCNC: 146 MMOL/L (ref 136–145)

## 2019-09-12 RX ADMIN — INSULIN HUMAN PRN UNIT: 100 INJECTION, SOLUTION PARENTERAL at 12:03

## 2019-09-12 RX ADMIN — Medication SCH EACH: at 09:02

## 2019-09-12 RX ADMIN — MEMANTINE HYDROCHLORIDE SCH MG: 5 TABLET ORAL at 21:10

## 2019-09-12 RX ADMIN — INSULIN ASPART SCH UNIT: 100 INJECTION, SOLUTION INTRAVENOUS; SUBCUTANEOUS at 17:00

## 2019-09-12 RX ADMIN — Medication PRN GM: at 09:14

## 2019-09-12 RX ADMIN — ALBUTEROL SCH MG: 2 TABLET ORAL at 16:37

## 2019-09-12 RX ADMIN — ALBUTEROL SCH MG: 2 TABLET ORAL at 09:02

## 2019-09-12 RX ADMIN — ESCITALOPRAM OXALATE SCH MG: 10 TABLET, FILM COATED ORAL at 09:02

## 2019-09-12 RX ADMIN — LOSARTAN POTASSIUM SCH MG: 50 TABLET, FILM COATED ORAL at 21:11

## 2019-09-12 RX ADMIN — Medication SCH EACH: at 09:03

## 2019-09-12 RX ADMIN — MEROPENEM SCH MLS/HR: 1 INJECTION INTRAVENOUS at 00:30

## 2019-09-12 RX ADMIN — MEGESTROL ACETATE SCH MG: 40 TABLET ORAL at 09:02

## 2019-09-12 RX ADMIN — Medication SCH EACH: at 16:56

## 2019-09-12 RX ADMIN — AMLODIPINE BESYLATE SCH MG: 5 TABLET ORAL at 21:11

## 2019-09-12 RX ADMIN — Medication SCH EACH: at 16:38

## 2019-09-12 RX ADMIN — Medication SCH EACH: at 08:00

## 2019-09-12 RX ADMIN — METOPROLOL SUCCINATE SCH MG: 50 TABLET, EXTENDED RELEASE ORAL at 09:08

## 2019-09-12 RX ADMIN — Medication SCH EACH: at 21:26

## 2019-09-12 RX ADMIN — INSULIN ASPART SCH UNIT: 100 INJECTION, SOLUTION INTRAVENOUS; SUBCUTANEOUS at 09:00

## 2019-09-12 RX ADMIN — MUPIROCIN SCH APPLIC: 20 OINTMENT TOPICAL at 03:00

## 2019-09-12 RX ADMIN — LOSARTAN POTASSIUM SCH MG: 50 TABLET, FILM COATED ORAL at 09:07

## 2019-09-12 RX ADMIN — Medication SCH EACH: at 12:02

## 2019-09-12 RX ADMIN — GLIMEPIRIDE SCH MG: 1 TABLET ORAL at 09:02

## 2019-09-12 RX ADMIN — Medication SCH MG: at 09:01

## 2019-09-12 RX ADMIN — MONTELUKAST SODIUM SCH MG: 10 TABLET, FILM COATED ORAL at 21:11

## 2019-09-12 RX ADMIN — MUPIROCIN SCH APPLIC: 20 OINTMENT TOPICAL at 15:51

## 2019-09-12 RX ADMIN — Medication SCH MG: at 09:03

## 2019-09-12 RX ADMIN — Medication SCH APPLIC: at 09:16

## 2019-09-12 RX ADMIN — MEROPENEM SCH MLS/HR: 1 INJECTION INTRAVENOUS at 12:34

## 2019-09-12 RX ADMIN — PANTOPRAZOLE SODIUM SCH MG: 40 TABLET, DELAYED RELEASE ORAL at 07:56

## 2019-09-12 RX ADMIN — QUETIAPINE SCH MG: 25 TABLET, FILM COATED ORAL at 21:11

## 2019-09-12 NOTE — NUR
RN OPENING NOTES



RECEIVED PATIENT RESTING IN BED COMFORTABLY. SHE IS AOX1, NON-VERBAL, NON AMBULATORY. SHE 
OPENS HER EYES WHEN SPOKEN TOO AND FOLLOWS COMMANDS. SHE IS ON RA, TOLERATING WELL, SHOWS NO 
S/SX OF RESP DISTRESS OR SOB. 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 
[facility] THICKENED LIQUIDS SHE HAS A ISABEL MIDLINE, PATENT AND INTACT. 
SAFETY MEASURES HAVE BEEN IMPLEMENTED, CALL LIGHT IS WITHIN REACH, BED IS IN LOWEST AND 
LOCKED POSITION, CALL LIGHT IS WITHIN REACH, SIDE RAILS UP X2, WILL CONTINUE TO MONITOR

## 2019-09-12 NOTE — NUR
MS RN NOTE:



RECEIVED PT ON BED AWAKE BUT NON VERBAL. NO ACUTE DISTRESS NOTED. NO FACIAL GRIMACING OR ANY 
SIGNS OF PAIN NOTED. ON ROOM AIR, SATURATING WELL. BREATHING EVEN AND UNLABORED WITH NORMAL 
RESPIRATIONS. LEFT UPPER ARM MIDLINE INTACT AND PATENT, FLUSHING WELL. KEPT CLEAN, DRY AND 
COMFORTABLE. SAFETY AND FALL PRECAUTIONS OBSERVED AND MAINTAINED. WILL CONTINUE TO MONITOR 
PT.

## 2019-09-12 NOTE — NUR
RN CLOSING NOTES



PATIENT IS RESTING IN BED COMFORTABLY, DENIES ANY PAIN OR DISCOMFORT AT THIS TIME. NO ACUTE 
CHANGES OCCURRED DURING THE SHIFT. VITAL SIGNS ARE STABLE. PATIENT NEEDS HAVE BEEN MET. 
SAFETY MEASURES HAVE BEEN IMPLEMENTED, CALL LIGHT IS WITHIN REACH, BED IS IN LOWEST AND 
LOCKED POSITION, SIDE RAILS UP X2. PT HAS BEEN ENDORSED TO NIGHTSHIFT RN FOR CONTINUAL CARE

## 2019-09-12 NOTE — NUR
RN CLOSING NOTES:



PT A0 TIMES 1, RESPONDS TO PAINFUL STIMULI.  RESTING, NON VERBAL, NO SIGNS OR SYMPTOMS OF 
ACUTE DISTRESS OR DISCOMFORT. NO CHANGES OVER THE COURSE OF MY SHIFT. PT IN STABLE 
CONDITION., CALL LIGHT WITHIN REACH, PM MEDS GIVEN, ALL MEDS GIVEN. , WILL ENDORSE TO AM 
SHIFT TO CARRY OUT PLAN OF CARE AND MAKE SURE

## 2019-09-13 VITALS — SYSTOLIC BLOOD PRESSURE: 136 MMHG | DIASTOLIC BLOOD PRESSURE: 74 MMHG

## 2019-09-13 VITALS — DIASTOLIC BLOOD PRESSURE: 57 MMHG | SYSTOLIC BLOOD PRESSURE: 138 MMHG

## 2019-09-13 VITALS — SYSTOLIC BLOOD PRESSURE: 154 MMHG | DIASTOLIC BLOOD PRESSURE: 48 MMHG

## 2019-09-13 VITALS — SYSTOLIC BLOOD PRESSURE: 127 MMHG | DIASTOLIC BLOOD PRESSURE: 50 MMHG

## 2019-09-13 LAB
BUN SERPL-MCNC: 11 MG/DL (ref 7–18)
CALCIUM SERPL-MCNC: 8.9 MG/DL (ref 8.5–10.1)
CHLORIDE SERPL-SCNC: 108 MMOL/L (ref 98–107)
CO2 SERPL-SCNC: 28 MMOL/L (ref 21–32)
CREAT SERPL-MCNC: 0.8 MG/DL (ref 0.6–1.3)
GLUCOSE SERPL-MCNC: 73 MG/DL (ref 74–106)
POTASSIUM SERPL-SCNC: 4 MMOL/L (ref 3.5–5.1)
SODIUM SERPL-SCNC: 144 MMOL/L (ref 136–145)

## 2019-09-13 RX ADMIN — GLIMEPIRIDE SCH MG: 1 TABLET ORAL at 08:32

## 2019-09-13 RX ADMIN — Medication SCH MG: at 08:35

## 2019-09-13 RX ADMIN — ALBUTEROL SCH MG: 2 TABLET ORAL at 08:31

## 2019-09-13 RX ADMIN — Medication SCH EACH: at 13:02

## 2019-09-13 RX ADMIN — INSULIN ASPART SCH UNIT: 100 INJECTION, SOLUTION INTRAVENOUS; SUBCUTANEOUS at 08:52

## 2019-09-13 RX ADMIN — Medication SCH EACH: at 08:32

## 2019-09-13 RX ADMIN — MEROPENEM SCH MLS/HR: 1 INJECTION INTRAVENOUS at 00:25

## 2019-09-13 RX ADMIN — MEROPENEM SCH MLS/HR: 1 INJECTION INTRAVENOUS at 12:27

## 2019-09-13 RX ADMIN — ESCITALOPRAM OXALATE SCH MG: 10 TABLET, FILM COATED ORAL at 08:31

## 2019-09-13 RX ADMIN — MUPIROCIN SCH APPLIC: 20 OINTMENT TOPICAL at 15:07

## 2019-09-13 RX ADMIN — Medication SCH APPLIC: at 08:49

## 2019-09-13 RX ADMIN — Medication SCH MG: at 08:36

## 2019-09-13 RX ADMIN — MEGESTROL ACETATE SCH MG: 40 TABLET ORAL at 08:33

## 2019-09-13 RX ADMIN — Medication SCH EACH: at 08:36

## 2019-09-13 RX ADMIN — LOSARTAN POTASSIUM SCH MG: 50 TABLET, FILM COATED ORAL at 08:33

## 2019-09-13 RX ADMIN — METOPROLOL SUCCINATE SCH MG: 50 TABLET, EXTENDED RELEASE ORAL at 08:35

## 2019-09-13 RX ADMIN — Medication SCH EACH: at 08:09

## 2019-09-13 RX ADMIN — MUPIROCIN SCH APPLIC: 20 OINTMENT TOPICAL at 02:44

## 2019-09-13 RX ADMIN — PANTOPRAZOLE SODIUM SCH MG: 40 TABLET, DELAYED RELEASE ORAL at 07:58

## 2019-09-13 NOTE — NUR
MS RN NOTE:



NO CHANGES NOTED THROUGHOUT THE SHIFT. NO APPARENT DISTRESS NOTED. NO FACIAL GRIMACING OR 
ANY SIGNS OF PAIN NOTED. LEFT UPPER ARM MIDLINE INTACT AND PATENT, FLUSHING WELL. KEPT 
CLEAN, DRY AND COMFORTABLE. SAFETY AND FALL PRECAUTIONS OBSERVED AND MAINTAINED. WILL 
ENDORSE TO DAY SHIFT RN FOR CONTINUITY OF CARE.

## 2019-09-13 NOTE — NUR
MS RN NOTES

RECEIVED PT IN BED SLEEPING COMFORTABLY. PT ON ROOM AIR AND NO SIGNS OF SOB NOTED AT THIS 
TIME.PT DAVIS JUAN MIDLINE AND R HAND #22 SL BOTH FLUSHED WELL. ALL SAFETY MEASURES OBSERVED. 
BED AT LOWEST POSITION LOCKED. SIDE RAILS UP. WITH CNA ASSISTANT % OF BREAKFAST. CALL 
LIGHT IN REACH. WILL CONTINUE TO MONITOR.

## 2020-10-28 NOTE — NUR
TELE RN NOTES



2200: PATIENT BLOOD SUGAR 61 MG/DL, RECHECKED 59 MG/DL. D50 GIVEN AT 2218, RECHECKED AT 
2258, BS  MG/DL. HOSPITALIST MADE AWARE, NO NEW ORDERS NOTED. 



AT 2230: PATIENT NOTED TO BE ABLE TO SWALLOW WITHOUT COUGHING. ABLE TO GIVE PO MEDICATIONS 
(CRUSHED). WILL CONT TO MONITOR PT CLOSELY. Patient's belongings returned